# Patient Record
Sex: MALE | Race: WHITE
[De-identification: names, ages, dates, MRNs, and addresses within clinical notes are randomized per-mention and may not be internally consistent; named-entity substitution may affect disease eponyms.]

---

## 2017-04-25 ENCOUNTER — HOSPITAL ENCOUNTER (EMERGENCY)
Dept: HOSPITAL 80 - CED | Age: 77
Discharge: HOME | End: 2017-04-25
Payer: COMMERCIAL

## 2017-04-25 VITALS — DIASTOLIC BLOOD PRESSURE: 75 MMHG | SYSTOLIC BLOOD PRESSURE: 125 MMHG

## 2017-04-25 VITALS — RESPIRATION RATE: 16 BRPM | TEMPERATURE: 98.6 F | OXYGEN SATURATION: 95 % | HEART RATE: 90 BPM

## 2017-04-25 DIAGNOSIS — J30.1: ICD-10-CM

## 2017-04-25 DIAGNOSIS — J02.9: Primary | ICD-10-CM

## 2017-04-25 NOTE — UCPHY
H & P


Time Seen by Provider: 04/25/17 09:20


Patient Type: New


HPI/ROS: 





76-year-old male presents complaining of sore throat for several days worse 

today, states is painful to swallow every is able to tolerate his own 

secretions and has been able to eat and drink.  He denies fevers or chills he 

does complain of some mucus buildup in the back of his throat which she 

attributes to seasonal allergies.





He has not noticed a change in his voice.








Review of systems


General no fever no chills no weakness


HEENT no eye pain no eye discharge. No eye redness, positive sore throat


Respiratory no cough, no shortness of breath


Cardiac no chest pain, no peripheral edema


GI no abdominal pain, no diarrhea, no constipation, no nausea, no vomiting


  no flank pain, no hematuria, no dysuria


Musculoskeletal no myalgias, no joint pain


Heme  no easy bruising, no easy bleeding


Endo no polyuria, no polydipsia


Skin no rashes, no pruritus


Neuro no syncope, no dizziness, no headaches


Psych is no suicidal ideation, no homicidal ideation





Past Medical/Surgical History: 





Hyperlipidemia, hypertension


Social History: 





Denies alcohol or drug use


Smoking Status: Never smoked


Physical Exam: 


76-year-old male


Alert and oriented in no acute distress nontoxic appearance, afebrile


Atraumatic normocephalic


Extraocular muscles intact, anicteric


Neck-supple, positive anterior cervical lymphadenopathy mildly tender to 

palpation


Oropharynx mildly erythematous, no uvular deviation, no purulent exudate, 

tolerating own secretions, no trismus


Lungs clear to auscultation bilaterally


Heart regular rate and rhythm


Abdomen normoactive bowel sounds soft nontender


Extremities no cyanosis clubbing edema


Skin no rash


Constitutional: 


 Initial Vital Signs











Temperature (C)  37 C   04/25/17 08:41


 


Heart Rate  90   04/25/17 08:41


 


Respiratory Rate  16   04/25/17 08:41


 


Blood Pressure  128/77 H  04/25/17 08:41


 


O2 Sat (%)  95   04/25/17 08:41








 











O2 Delivery Mode               Room Air














Allergies/Adverse Reactions: 


 





codeine [Codeine] Allergy (Severe, Verified 04/25/17 08:52)


 HIVES/DIFFICULTY BREATHING/ITCHY


Penicillins Allergy (Severe, Verified 04/25/17 08:52)


 Hives


ENVIRONMENTAL Allergy (Intermediate, Uncoded 04/25/17 08:52)


 SNEEZING/STUFFY NOSE








Home Medications: 














 Medication  Instructions  Recorded


 


Atorvastatin Calcium  04/25/17


 


Lisinopril  04/25/17














Medical Decision Making


ED Course/Re-evaluation: 





Patient seen and evaluated for sore throat of 3-4 days duration.





Rapid strep negative


Strep culture pending





Differential diagnosis


Strep pharyngitis, viral pharyngitis, generalized irritation of the throat 

secondary to seasonal allergies





Impression


Pharyngitis


Seasonal allergies





Plan


Follow up with primary care physician





- Data Points


Laboratory Results: 


 











  04/25/17 04/25/17





  Unknown 08:48


 


Group A Strep Screen    NEGATIVE 





    (NEGATIVE) 


 


Group A Strep DNA  Pending   





   














Departure





- Departure


Disposition: Home, Routine, Self-Care


Clinical Impression: 


 Pharyngitis, Seasonal allergies





Condition: Good


Instructions:  Pharyngitis (ED), Allergic Rhinitis (ED), Allergies (ED)


Referrals: 


Lyssa Stallworth MD [Primary Care Provider] - As per Instructions





- PQRS


PQRS Measurement: 








134:   Depression screening and followup, PRIME MD-PHQ2  (12 years and older)


Over the last 2 weeks, how often have you been bothered by any of the following 

problems? 


 1. Feeling down, depressed, or hopeless?


2. Little interest or pleasure in doing things? 


Patient answered no to both 1 and 2





130:  Documentation of medications.  


Reviewed all patient medications, doses, route and frequency.





226:  Do you smoke?





No.





47:  65 and older: Advanced care planning.


Patient designates surrogate decision maker as  spouse..





[Patient has advanced directive.]





51:  18 years old and older with diagnosis of COPD, spirometry performance.


[Patient has no history of COPD





52:  18 years old and older with COPD and symptoms of COPD or FEV1<60% 

predicted prescribed a B Agonist.


[Spirometry not performed; equipment not available.]

## 2018-05-26 ENCOUNTER — HOSPITAL ENCOUNTER (OUTPATIENT)
Dept: HOSPITAL 80 - SUPIMAGING | Age: 78
End: 2018-05-26
Payer: COMMERCIAL
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
## 2018-05-26 DIAGNOSIS — S92.522A: Primary | ICD-10-CM
